# Patient Record
Sex: MALE | Race: BLACK OR AFRICAN AMERICAN | NOT HISPANIC OR LATINO | Employment: OTHER | URBAN - METROPOLITAN AREA
[De-identification: names, ages, dates, MRNs, and addresses within clinical notes are randomized per-mention and may not be internally consistent; named-entity substitution may affect disease eponyms.]

---

## 2018-01-27 ENCOUNTER — HOSPITAL ENCOUNTER (EMERGENCY)
Facility: HOSPITAL | Age: 33
Discharge: HOME | End: 2018-01-27
Attending: EMERGENCY MEDICINE
Payer: MEDICAID

## 2018-01-27 VITALS
RESPIRATION RATE: 20 BRPM | SYSTOLIC BLOOD PRESSURE: 146 MMHG | WEIGHT: 166.13 LBS | DIASTOLIC BLOOD PRESSURE: 79 MMHG | HEART RATE: 59 BPM | TEMPERATURE: 99 F | BODY MASS INDEX: 22.5 KG/M2 | OXYGEN SATURATION: 100 % | HEIGHT: 72 IN

## 2018-01-27 DIAGNOSIS — S61.411A: Primary | ICD-10-CM

## 2018-01-27 PROCEDURE — 90715 TDAP VACCINE 7 YRS/> IM: CPT | Performed by: EMERGENCY MEDICINE

## 2018-01-27 PROCEDURE — 90471 IMMUNIZATION ADMIN: CPT | Performed by: EMERGENCY MEDICINE

## 2018-01-27 PROCEDURE — 63600175 PHARM REV CODE 636 W HCPCS: Performed by: EMERGENCY MEDICINE

## 2018-01-27 PROCEDURE — 25000003 PHARM REV CODE 250: Performed by: EMERGENCY MEDICINE

## 2018-01-27 PROCEDURE — 99283 EMERGENCY DEPT VISIT LOW MDM: CPT

## 2018-01-27 RX ORDER — ACETAMINOPHEN 500 MG
1000 TABLET ORAL
Status: COMPLETED | OUTPATIENT
Start: 2018-01-27 | End: 2018-01-27

## 2018-01-27 RX ORDER — NAPROXEN 500 MG/1
500 TABLET ORAL 2 TIMES DAILY WITH MEALS
Qty: 20 TABLET | Refills: 0 | Status: SHIPPED | OUTPATIENT
Start: 2018-01-27 | End: 2019-08-09

## 2018-01-27 RX ADMIN — CLOSTRIDIUM TETANI TOXOID ANTIGEN (FORMALDEHYDE INACTIVATED), CORYNEBACTERIUM DIPHTHERIAE TOXOID ANTIGEN (FORMALDEHYDE INACTIVATED), BORDETELLA PERTUSSIS TOXOID ANTIGEN (GLUTARALDEHYDE INACTIVATED), BORDETELLA PERTUSSIS FILAMENTOUS HEMAGGLUTININ ANTIGEN (FORMALDEHYDE INACTIVATED), BORDETELLA PERTUSSIS PERTACTIN ANTIGEN, AND BORDETELLA PERTUSSIS FIMBRIAE 2/3 ANTIGEN 0.5 ML: 5; 2; 2.5; 5; 3; 5 INJECTION, SUSPENSION INTRAMUSCULAR at 04:01

## 2018-01-27 RX ADMIN — ACETAMINOPHEN 1000 MG: 500 TABLET ORAL at 04:01

## 2018-01-27 NOTE — ED PROVIDER NOTES
"SCRIBE #1 NOTE: I, Corinne Mack, am scribing for, and in the presence of, Daniel Wadsworth Jr., MD. I have scribed the entire note.      History      Chief Complaint   Patient presents with    Hand Pain     "i cut it two weeks ago and it still hurts       Review of patient's allergies indicates:   Allergen Reactions    Sulfa (sulfonamide antibiotics) Swelling        HPI   HPI    1/27/2018, 3:55 PM   History obtained from the patient      History of Present Illness: Tyler Kruse Jr. is a 32 y.o. male patient who presents to the Emergency Department for R hand pain which onset 2 weeks ago. Pt cut his hand on broken glass while replacing a window at his place of work. Pt did not seek any medical attention for his injury prior to today. Pt now c/o R hand swelling and pain. Symptoms are constant and moderate in severity. Pt's pain is exacerbated by ROM of his R hand. No mitigating factors reported. No other associated sxs reported. Patient denies any fever, chills, diaphoresis, SOB, cough, N/V/D, arthralgias, HA, dizziness, and all other sxs at this time. No prior Tx reported. No further complaints or concerns at this time. Unknown if pt is UTD on his Tetanus shot.        Arrival mode: Personal vehicle     PCP: Primary Doctor No       Past Medical History:  No past medical history on file.    Past Surgical History:  No past surgical history on file.      Family History:  No family history on file.    Social History:  Social History     Social History Main Topics    Smoking status: Former Smoker     Types: Cigarettes    Smokeless tobacco: Not on file    Alcohol use No    Drug use: No    Sexual activity: Not on file       ROS   Review of Systems   Constitutional: Negative for chills, diaphoresis and fever.   Respiratory: Negative for cough and shortness of breath.    Gastrointestinal: Negative for diarrhea, nausea and vomiting.   Genitourinary: Negative for dysuria.   Musculoskeletal: Positive for myalgias (R hand). " Negative for arthralgias, back pain and neck stiffness.        (+) R hand swelling   Skin: Positive for wound (R hand). Negative for rash.   Neurological: Negative for dizziness, weakness, light-headedness, numbness and headaches.   Hematological: Does not bruise/bleed easily.   All other systems reviewed and are negative.    Physical Exam      Initial Vitals [01/27/18 1540]   BP Pulse Resp Temp SpO2   (!) 146/79 (!) 59 20 98.6 °F (37 °C) 100 %      MAP       101.33          Physical Exam  Nursing Notes and Vital Signs Reviewed.  Constitutional: Patient is in no apparent distress. Well-developed and well-nourished.  Head: Atraumatic. Normocephalic.  Eyes: PERRL. EOM intact. Conjunctivae are not pale. No scleral icterus.  Neck: Supple. Full ROM.   Cardiovascular: Regular rate. Regular rhythm. No murmurs, rubs, or gallops. Distal pulses are 2+ and symmetric.  Pulmonary/Chest: No respiratory distress. Clear to auscultation bilaterally. No wheezing or rales.  Abdominal: Soft and non-distended.    Musculoskeletal: Moves all extremities. No obvious deformities.  Right Hand: No obvious deformity. Old scar noted over the 3rd MP joint that appears well-healing with no overt signs of infection, palpable fluctuance, erythema, or induration.There is mild swelling over the dorsal aspect of the 3rd MP joint.  There is mild tenderness to palpation over the R 3rd finger.  Full flexion and extension of the wrist.  Full flexion and extension of all fingers at the DIP, PIP and MCP joints.  Normal finger abduction, adduction, and thumb opposition.  Normal OK sign.  Able to make a fist without scissoring.  No laxity of the ulnar or radial collateral ligaments of the phalanges.  Radial, median, and ulnar nerves are intact. Radial and ulnar pulses are 2+. Normal capillary refill.  Distal sensation is intact.  Skin: Warm and dry.  Neurological:  Alert, awake, and appropriate.  Normal speech.  No acute focal neurological deficits are  appreciated.  Psychiatric: Normal affect. Good eye contact. Appropriate in content.    ED Course    Procedures  ED Vital Signs:  Vitals:    01/27/18 1540   BP: (!) 146/79   Pulse: (!) 59   Resp: 20   Temp: 98.6 °F (37 °C)   TempSrc: Oral   SpO2: 100%   Weight: 75.4 kg (166 lb 1.9 oz)   Height: 6' (1.829 m)       Abnormal Lab Results:  Labs Reviewed - No data to display     All Lab Results:  None    Imaging Results:  Imaging Results          X-Ray Hand 3 view Right (Final result)  Result time 01/27/18 16:24:03    Final result by Nico Loomis MD (01/27/18 16:24:03)                 Impression:           1.  Negative for acute process involving the visualized osseous structures.  2.  Moderate soft tissue swelling overlies the dorsum of the hand at the level of the knuckles, without air in the soft tissues or radiopaque foreign bodies.      Electronically signed by: NICO LOOMIS MD  Date:     01/27/18  Time:    16:24              Narrative:    Right hand x-ray, 3 views :    Clinical Indication: 3rd metacarpophalangeal joint pain . Pain in right hand    Findings: No comparison studies are available. 3 views of the right hand were submitted for interpretation.  Joint spaces are well-maintained.     Alignment is satisfactory. No   fractures, dislocations, or erosive arthritic change.  Negative for radiopaque foreign bodies or air in the soft tissues. Moderate soft tissue swelling involves the dorsum of the hand, overlying the knuckles.                                      The Emergency Provider reviewed the vital signs and test results, which are outlined above.    ED Discussion     5:06 PM: Reassessed pt at this time. Pt is awake, alert, and in no distress. Discussed with pt all pertinent ED information and results. Discussed pt dx and plan of tx. Gave pt all f/u and return to the ED instructions. All questions and concerns were addressed at this time. Pt expresses understanding of information and instructions, and is  comfortable with plan to discharge. Pt is stable for discharge.    I discussed with patient and/or family/caretaker that evaluation in the ED does not suggest any emergent or life threatening medical conditions requiring immediate intervention beyond what was provided in the ED, and I believe patient is safe for discharge.  Regardless, an unremarkable evaluation in the ED does not preclude the development or presence of a serious of life threatening condition. As such, patient was instructed to return immediately for any worsening or change in current symptoms.    I discussed wound care precautions with patient and/or family/caretaker; specifically that all wounds have risk of infection despite efforts to cleanse and debride the wound; and there is a risk of an occult foreign body (and thus increased risk of infection) despite a negative examination.  I discussed with patient need to return for any signs of infection, specifically redness, increased pain, fever, drainage of pus, or any concern, immediately.    Regarding LACERATION CARE, patient was instructed to wash hands with soap and warm water before and after caring for wound; keep the wound dry for the first 24 to 48 hours and then gently clean the wound once or twice a day with cool water using soap to clean around the wound; avoid using alcohol or hydrogen peroxide to clean wound unless directed to; and use bandages to keep wound clean and protected and to prevent swelling.  Advised patient to contact primary healthcare provider if wound splits open; becomes extremely painful; appears to not be healing; has a foreign object present; develop a purulent discharge; or note the skin around the wound becoming numb, edematous, or erythematous.  Patient instructed to follow up with primary care provider for wound re-check in 8-10 days.      ED Medication(s):  Medications   acetaminophen tablet 1,000 mg (1,000 mg Oral Given 1/27/18 5876)   Tdap vaccine injection 0.5  mL (0.5 mLs Intramuscular Given 1/27/18 1608)       Discharge Medication List as of 1/27/2018  5:44 PM      START taking these medications    Details   naproxen (NAPROSYN) 500 MG tablet Take 1 tablet (500 mg total) by mouth 2 (two) times daily with meals., Starting Sat 1/27/2018, Print             Follow-up Information     [facility] - Internal Medicine. Schedule an appointment as soon as possible for a visit in 1 week.    Specialty:  Internal Medicine  Contact information:  [address] [address] [phone]  Additional information:  (off [employer]) 1st floor           [facility]. Schedule an appointment as soon as possible for a visit in 1 week.    Contact information:  [address] [address] [phone]             [facility] - Orthopedics. Schedule an appointment as soon as possible for a visit in 1 week.    Specialty:  Orthopedics  Contact information:  [address] [address] [phone]  Additional information:  (off [employer]) 2nd floor           [facility] - .    Specialty:  Emergency Medicine  Why:  As needed, If symptoms worsen  Contact information:  [address] [facility] [address] [phone]                   Medical Decision Making    Medical Decision Making:   Clinical Tests:   Radiological Study: Ordered and Reviewed           Scribe Attestation:   Scribe #1: I performed the above scribed service and the documentation accurately describes the services I performed. I attest to the accuracy of the note.    Attending:   Physician Attestation Statement for Scribe #1: I, Daniel Wadsworth Jr., MD, personally performed the services described in this documentation, as scribed by Corinne Mack, in my presence, and it is both accurate and complete.          Clinical Impression       ICD-10-CM ICD-9-CM   1. Laceration of right hand with delay in treatment, initial encounter  S61.411A 882.1       Disposition:   Disposition: Discharged  Condition: Stable         Daniel Wadsworth Jr., MD  01/28/18 1028

## 2018-03-05 ENCOUNTER — HOSPITAL ENCOUNTER (EMERGENCY)
Facility: HOSPITAL | Age: 33
Discharge: HOME | End: 2018-03-05
Payer: MEDICAID

## 2018-03-05 VITALS
SYSTOLIC BLOOD PRESSURE: 154 MMHG | HEIGHT: 71 IN | BODY MASS INDEX: 22.82 KG/M2 | DIASTOLIC BLOOD PRESSURE: 77 MMHG | RESPIRATION RATE: 20 BRPM | HEART RATE: 66 BPM | TEMPERATURE: 98 F | OXYGEN SATURATION: 99 % | WEIGHT: 163 LBS
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
## 2018-03-05 DIAGNOSIS — S80.862A INSECT BITE OF LEFT LEG, INITIAL ENCOUNTER: Primary | ICD-10-CM

## 2018-03-05 PROCEDURE — 99283 EMERGENCY DEPT VISIT LOW MDM: CPT

## 2018-03-05 RX ORDER — CLINDAMYCIN HYDROCHLORIDE 150 MG/1
450 CAPSULE ORAL EVERY 8 HOURS
Qty: 60 CAPSULE | Refills: 0 | Status: SHIPPED | OUTPATIENT
Start: 2018-03-05 | End: 2018-03-12

## 2018-03-05 RX ORDER — MUPIROCIN 20 MG/G
OINTMENT TOPICAL 3 TIMES DAILY
Qty: 15 G | Refills: 0 | Status: SHIPPED | OUTPATIENT
Start: 2018-03-05 | End: 2018-03-15

## 2018-03-06 NOTE — ED PROVIDER NOTES
SCRIBE #1 NOTE: I, Ailyn Birch, am scribing for, and in the presence of, HILARIO Reyes. I have scribed the entire note.      History      Chief Complaint   Patient presents with    Insect Bite     insect bite to L knee with pain, redness and swelling       Review of patient's allergies indicates:   Allergen Reactions    Sulfa (sulfonamide antibiotics) Swelling        HPI   HPI    3/5/2018, 9:19 PM   History obtained from the patient      History of Present Illness: Tyler Kruse Jr. is a 32 y.o. male patient who presents to the Emergency Department for insect bite to L knee which onset gradually a few days ago. Patient states he has been cutting grass the last few days. Symptoms are constant and moderate in severity. No mitigating or exacerbating factors reported. Associated sxs include increased redness and swelling to lateral aspect of L knee. Patient denies any fever, chills, increased warmth to L knee, decreased ROM of L knee, L leg pain, extremity weakness/numbness, and all other sxs at this time. No further complaints or concerns at this time.     Arrival mode: Personal vehicle      PCP: Primary Doctor No       Past Medical History:  No past medical history on file.    Past Surgical History:  No past surgical history on file.      Family History:  No family history on file.    Social History:  Social History     Social History Main Topics    Smoking status: Former Smoker     Types: Cigarettes    Smokeless tobacco: Unknown    Alcohol use No    Drug use: No    Sexual activity: Unknown       ROS   Review of Systems   Constitutional: Negative for fever.   HENT: Negative for sore throat.    Respiratory: Negative for shortness of breath.    Cardiovascular: Negative for chest pain.   Gastrointestinal: Negative for nausea.   Genitourinary: Negative for dysuria.   Musculoskeletal: Negative for back pain.        (-) decreased ROM of L knee, L leg pain   Skin: Negative for rash.        (+) insect bite to L knee,  "increased redness and swelling to lateral aspect of L knee  (-) increased warmth to L knee   Neurological: Negative for weakness.   Hematological: Does not bruise/bleed easily.   All other systems reviewed and are negative.      Physical Exam      Initial Vitals [03/05/18 2055]   BP Pulse Resp Temp SpO2   (!) 154/77 66 20 98.2 °F (36.8 °C) 99 %      MAP       102.67          Physical Exam  Nursing Notes and Vital Signs Reviewed.  Constitutional: Patient is in no acute distress. Well-developed and well-nourished.  Head: Atraumatic. Normocephalic.  Eyes: PERRL. EOM intact. Conjunctivae are not pale. No scleral icterus.  ENT: Mucous membranes are moist. Oropharynx is clear and symmetric.    Neck: Supple. Full ROM. No lymphadenopathy.  Cardiovascular: Regular rate. Regular rhythm. No murmurs, rubs, or gallops. Distal pulses are 2+ and symmetric.  Pulmonary/Chest: No respiratory distress. Clear to auscultation bilaterally. No wheezing or rales.  Abdominal: Soft and non-distended.  There is no tenderness.  No rebound, guarding, or rigidity. Good bowel sounds.  Genitourinary: No CVA tenderness  Musculoskeletal: Moves all extremities. No obvious deformities. No edema. No calf tenderness.  Skin: Warm and dry. Area of erythema and tenderness to lateral aspect of L knee with apparent insect bite to central portion, no fluctuance.  Neurological:  Alert, awake, and appropriate.  Normal speech.  No acute focal neurological deficits are appreciated.  Psychiatric: Normal affect. Good eye contact. Appropriate in content.    ED Course    Procedures  ED Vital Signs:  Vitals:    03/05/18 2055   BP: (!) 154/77   Pulse: 66   Resp: 20   Temp: 98.2 °F (36.8 °C)   TempSrc: Oral   SpO2: 99%   Weight: 73.9 kg (163 lb 0.5 oz)   Height: 5' 11" (1.803 m)              The Emergency Provider reviewed the vital signs and test results, which are outlined above.    ED Discussion     9:22 PM: Reassessed pt at this time.  Discussed with pt all " pertinent ED information and results. Discussed pt dx and plan of tx. Gave pt all f/u and return to the ED instructions. All questions and concerns were addressed at this time. Pt expresses understanding of information and instructions, and is comfortable with plan to discharge. Pt is stable for discharge.    I discussed with patient and/or family/caretaker that evaluation in the ED does not suggest any emergent or life threatening medical conditions requiring immediate intervention beyond what was provided in the ED, and I believe patient is safe for discharge.  Regardless, an unremarkable evaluation in the ED does not preclude the development or presence of a serious of life threatening condition. As such, patient was instructed to return immediately for any worsening or change in current symptoms.      ED Medication(s):  Medications - No data to display    New Prescriptions    CLINDAMYCIN (CLEOCIN) 150 MG CAPSULE    Take 3 capsules (450 mg total) by mouth every 8 (eight) hours.    MUPIROCIN (BACTROBAN) 2 % OINTMENT    Apply topically 3 (three) times daily.             Medical Decision Making              Scribe Attestation:   Scribe #1: I performed the above scribed service and the documentation accurately describes the services I performed. I attest to the accuracy of the note.    Attending:   Physician Attestation Statement for Scribe #1: I, HILARIO Reyes, personally performed the services described in this documentation, as scribed by Ailyn Birch, in my presence, and it is both accurate and complete.          Clinical Impression       ICD-10-CM ICD-9-CM   1. Insect bite of left leg, initial encounter S80.862A 916.4    W57.XXXA E906.4       Disposition:   Disposition: Discharged  Condition: Stable         HILARIO Daly  03/05/18 8383

## 2019-08-09 ENCOUNTER — HOSPITAL ENCOUNTER (EMERGENCY)
Facility: HOSPITAL | Age: 34
Discharge: HOME | End: 2019-08-09
Attending: FAMILY MEDICINE
Payer: MEDICAID

## 2019-08-09 VITALS
TEMPERATURE: 98 F | SYSTOLIC BLOOD PRESSURE: 124 MMHG | WEIGHT: 170 LBS | BODY MASS INDEX: 23.03 KG/M2 | HEART RATE: 60 BPM | RESPIRATION RATE: 18 BRPM | OXYGEN SATURATION: 100 % | HEIGHT: 72 IN | DIASTOLIC BLOOD PRESSURE: 73 MMHG

## 2019-08-09 DIAGNOSIS — S90.30XA CONTUSION OF FOOT, UNSPECIFIED LATERALITY, INITIAL ENCOUNTER: Primary | ICD-10-CM

## 2019-08-09 DIAGNOSIS — M79.673 FOOT PAIN: ICD-10-CM

## 2019-08-09 PROCEDURE — 99283 EMERGENCY DEPT VISIT LOW MDM: CPT | Mod: 25

## 2019-08-09 PROCEDURE — 25000003 PHARM REV CODE 250: Performed by: NURSE PRACTITIONER

## 2019-08-09 RX ORDER — TRAMADOL HYDROCHLORIDE 50 MG/1
50 TABLET ORAL EVERY 6 HOURS PRN
Qty: 12 TABLET | Refills: 0 | Status: SHIPPED | OUTPATIENT
Start: 2019-08-09 | End: 2021-02-25 | Stop reason: ALTCHOICE

## 2019-08-09 RX ORDER — HYDROCODONE BITARTRATE AND ACETAMINOPHEN 5; 325 MG/1; MG/1
1 TABLET ORAL
Status: COMPLETED | OUTPATIENT
Start: 2019-08-09 | End: 2019-08-09

## 2019-08-09 RX ADMIN — HYDROCODONE BITARTRATE AND ACETAMINOPHEN 1 TABLET: 5; 325 TABLET ORAL at 11:08

## 2019-08-09 NOTE — ED PROVIDER NOTES
Encounter Date: 8/9/2019       History     Chief Complaint   Patient presents with    Foot Pain     pt reports he dropped a peice of wood onto the top of his L foot, area swollen and tender to touch. Good ROM     33 year old male with complaint of left foot pain X one hour.  Pt reports that he dropped a heavy piece of plywood on left foot.  Moderate pain. Worse with walking. No radiation of pain.  Mild relief with rest of foot.         Review of patient's allergies indicates:   Allergen Reactions    Sulfa (sulfonamide antibiotics) Swelling     History reviewed. No pertinent past medical history.  History reviewed. No pertinent surgical history.  History reviewed. No pertinent family history.  Social History     Tobacco Use    Smoking status: Former Smoker     Types: Cigarettes   Substance Use Topics    Alcohol use: No    Drug use: No     Types: Marijuana     Review of Systems   Constitutional: Negative for fever.   HENT: Negative for sore throat.    Respiratory: Negative for shortness of breath.    Cardiovascular: Negative for chest pain.   Gastrointestinal: Negative for nausea.   Genitourinary: Negative for dysuria.   Musculoskeletal: Negative for back pain.        Left foot pain    Skin: Negative for rash.   Neurological: Negative for weakness.   Hematological: Does not bruise/bleed easily.       Physical Exam     Initial Vitals [08/09/19 1106]   BP Pulse Resp Temp SpO2   124/73 60 18 97.6 °F (36.4 °C) 100 %      MAP       --         Physical Exam    Nursing note and vitals reviewed.  Constitutional: He appears well-developed and well-nourished.   HENT:   Head: Normocephalic and atraumatic.   Eyes: Conjunctivae are normal. Pupils are equal, round, and reactive to light.   Neck: Normal range of motion. Neck supple.   Cardiovascular: Normal rate, regular rhythm, normal heart sounds and intact distal pulses.   Pulmonary/Chest: Breath sounds normal.   Abdominal: Soft. There is no rebound and no guarding.    Musculoskeletal: Normal range of motion.   Tenderness and swelling distal dorsal aspect of left foot, 2+ left dorsalis pedis pulse   Neurological: He is alert.   Skin: Skin is warm and dry.   Psychiatric: He has a normal mood and affect. His behavior is normal. Thought content normal.         ED Course   Splint Application  Date/Time: 8/9/2019 11:48 AM  Performed by: Dg Hogan NP  Authorized by: Lilia Christine MD   Comments: ACE bandage applied to left foot: alignment good, neurovascular status intact        Labs Reviewed - No data to display       Imaging Results          X-Ray Foot Complete Left (Final result)  Result time 08/09/19 11:36:19    Final result by Rafat Vernon Jr., MD (08/09/19 11:36:19)                 Impression:      Swelling without evidence of acute fracture.      Electronically signed by: Rafat Vernon Jr., MD  Date:    08/09/2019  Time:    11:36             Narrative:    EXAMINATION:  XR FOOT COMPLETE 3 VIEW LEFT    CLINICAL HISTORY:  . Direct trauma.    COMPARISON:  None    FINDINGS:  Focal soft tissue swelling about the dorsum of the foot.  No acute fracture or dislocation.  No osseous lesion.  No significant degenerative changes.No radiopaque foreign bodies.                                                      Clinical Impression:       ICD-10-CM ICD-9-CM   1. Contusion of foot, unspecified laterality, initial encounter S90.30XA 924.20   2. Foot pain M79.673 729.5                                Dg Hogan NP  08/09/19 1150       Dg Hogan NP  08/09/19 1150

## 2021-06-01 PROBLEM — E55.9 VITAMIN D DEFICIENCY: Status: ACTIVE | Noted: 2019-04-04

## 2021-07-01 ENCOUNTER — PATIENT MESSAGE (OUTPATIENT)
Dept: ADMINISTRATIVE | Facility: OTHER | Age: 36
End: 2021-07-01

## 2021-12-21 ENCOUNTER — HOSPITAL ENCOUNTER (EMERGENCY)
Facility: HOSPITAL | Age: 36
Discharge: HOME | End: 2021-12-21
Attending: EMERGENCY MEDICINE
Payer: MEDICAID

## 2021-12-21 VITALS
HEART RATE: 62 BPM | DIASTOLIC BLOOD PRESSURE: 91 MMHG | HEIGHT: 71 IN | TEMPERATURE: 98 F | BODY MASS INDEX: 26.71 KG/M2 | RESPIRATION RATE: 18 BRPM | SYSTOLIC BLOOD PRESSURE: 131 MMHG | WEIGHT: 190.81 LBS | OXYGEN SATURATION: 97 %

## 2021-12-21 DIAGNOSIS — S89.91XA RIGHT KNEE INJURY, INITIAL ENCOUNTER: ICD-10-CM

## 2021-12-21 DIAGNOSIS — S83.91XA SPRAIN OF RIGHT KNEE, UNSPECIFIED LIGAMENT, INITIAL ENCOUNTER: Primary | ICD-10-CM

## 2021-12-21 PROCEDURE — 25000003 PHARM REV CODE 250: Performed by: NURSE PRACTITIONER

## 2021-12-21 PROCEDURE — 99283 EMERGENCY DEPT VISIT LOW MDM: CPT | Mod: 25

## 2021-12-21 RX ORDER — KETOROLAC TROMETHAMINE 10 MG/1
10 TABLET, FILM COATED ORAL
Status: COMPLETED | OUTPATIENT
Start: 2021-12-21 | End: 2021-12-21

## 2021-12-21 RX ORDER — TRAMADOL HYDROCHLORIDE 50 MG/1
50 TABLET ORAL
Status: COMPLETED | OUTPATIENT
Start: 2021-12-21 | End: 2021-12-21

## 2021-12-21 RX ORDER — DICLOFENAC SODIUM 50 MG/1
50 TABLET, DELAYED RELEASE ORAL 3 TIMES DAILY PRN
Qty: 15 TABLET | Refills: 0 | Status: SHIPPED | OUTPATIENT
Start: 2021-12-21

## 2021-12-21 RX ADMIN — TRAMADOL HYDROCHLORIDE 50 MG: 50 TABLET, COATED ORAL at 09:12

## 2021-12-21 RX ADMIN — KETOROLAC TROMETHAMINE 10 MG: 10 TABLET, FILM COATED ORAL at 09:12

## 2022-02-17 ENCOUNTER — HOSPITAL ENCOUNTER (EMERGENCY)
Facility: HOSPITAL | Age: 37
Discharge: HOME | End: 2022-02-17
Attending: EMERGENCY MEDICINE
Payer: MEDICAID

## 2022-02-17 VITALS
RESPIRATION RATE: 18 BRPM | WEIGHT: 190.13 LBS | DIASTOLIC BLOOD PRESSURE: 89 MMHG | BODY MASS INDEX: 26.52 KG/M2 | HEART RATE: 67 BPM | SYSTOLIC BLOOD PRESSURE: 146 MMHG | OXYGEN SATURATION: 95 % | TEMPERATURE: 99 F

## 2022-02-17 DIAGNOSIS — L02.511 ABSCESS OF RIGHT THUMB: Primary | ICD-10-CM

## 2022-02-17 PROCEDURE — 99284 EMERGENCY DEPT VISIT MOD MDM: CPT | Mod: 25

## 2022-02-17 PROCEDURE — 25000003 PHARM REV CODE 250: Performed by: EMERGENCY MEDICINE

## 2022-02-17 PROCEDURE — 26010 DRAINAGE OF FINGER ABSCESS: CPT | Mod: F5

## 2022-02-17 RX ORDER — CLINDAMYCIN HYDROCHLORIDE 150 MG/1
450 CAPSULE ORAL EVERY 8 HOURS
Qty: 45 CAPSULE | Refills: 0 | Status: SHIPPED | OUTPATIENT
Start: 2022-02-17 | End: 2022-02-22

## 2022-02-17 RX ORDER — LIDOCAINE HYDROCHLORIDE 10 MG/ML
1 INJECTION, SOLUTION EPIDURAL; INFILTRATION; INTRACAUDAL; PERINEURAL
Status: COMPLETED | OUTPATIENT
Start: 2022-02-17 | End: 2022-02-17

## 2022-02-17 RX ORDER — HYDROCODONE BITARTRATE AND ACETAMINOPHEN 5; 325 MG/1; MG/1
1 TABLET ORAL EVERY 4 HOURS PRN
Qty: 11 TABLET | Refills: 0 | Status: SHIPPED | OUTPATIENT
Start: 2022-02-17 | End: 2022-02-22

## 2022-02-17 RX ADMIN — LIDOCAINE HYDROCHLORIDE 10 MG: 10 INJECTION, SOLUTION EPIDURAL; INFILTRATION; INTRACAUDAL; PERINEURAL at 07:02

## 2022-02-17 NOTE — ED PROVIDER NOTES
SCRIBE #1 NOTE: I, Leon Concepcion, am scribing for, and in the presence of, Adam Sagastume MD. I have scribed the entire note.      History     Chief Complaint   Patient presents with    Hand Pain     Right thumb swelling and pain x 2 weeks secondary to thorn puncture while at work      Review of patient's allergies indicates:   Allergen Reactions    Sulfa (sulfonamide antibiotics) Swelling         History of Present Illness     HPI    2/17/2022, 7:26 AM  History obtained from the patient      History of Present Illness: Tyler Kruse Jr. is a 36 y.o. male patient who presents to the Emergency Department for evaluation of right thumb injury which occurred suddenly 2 weeks ago. Pt reports that he was stuck by a thorn in his right thumb 2 weeks ago. Since this initial injury he has developed worsening pain and swelling to the thumb. Symptoms are constant and moderate in severity. No mitigating or exacerbating factors reported. No associated symptoms reported. Patient denies any fever, chills, N/V, abdominal pain, dizziness, weakness, numbness, and all other sxs at this time. No prior Tx reported. No further complaints or concerns at this time.      Arrival mode: Personal vehicle    PCP: Primary Doctor No        Past Medical History:  History reviewed. No pertinent past medical history.    Past Surgical History:  Past Surgical History:   Procedure Laterality Date    ORTHOPEDIC SURGERY           Family History:  No family history on file.    Social History:  Social History     Tobacco Use    Smoking status: Former Smoker     Types: Cigarettes    Smokeless tobacco: Not on file   Substance and Sexual Activity    Alcohol use: No    Drug use: No     Types: Marijuana    Sexual activity: Not on file        Review of Systems     Review of Systems   Constitutional: Negative for chills and fever.   HENT: Negative for congestion and sore throat.    Respiratory: Negative for cough and shortness of breath.     Cardiovascular: Negative for chest pain.   Gastrointestinal: Negative for abdominal pain, diarrhea, nausea and vomiting.   Genitourinary: Negative for dysuria.   Musculoskeletal: Negative for back pain.   Skin: Positive for wound (right thumb puncture with pain and swelling). Negative for rash.   Neurological: Negative for dizziness, weakness and numbness.   Hematological: Does not bruise/bleed easily.   All other systems reviewed and are negative.     Physical Exam     Initial Vitals   BP Pulse Resp Temp SpO2   02/17/22 0723 02/17/22 0722 02/17/22 0722 02/17/22 0723 02/17/22 0722   (!) 146/89 67 18 98.7 °F (37.1 °C) 95 %      MAP       --                 Physical Exam  Nursing Notes and Vital Signs Reviewed.  Constitutional: Patient is in no acute distress. Well-developed and well-nourished.  Head: Atraumatic. Normocephalic.  Eyes: PERRL. EOM intact. Conjunctivae are not pale. No scleral icterus.  ENT: Mucous membranes are moist. Oropharynx is clear and symmetric.    Neck: Supple. Full ROM. No lymphadenopathy.  Cardiovascular: Regular rate. Regular rhythm. No murmurs, rubs, or gallops. Distal pulses are 2+ and symmetric.  Pulmonary/Chest: No respiratory distress. Clear to auscultation bilaterally. No wheezing or rales.  Abdominal: Soft and non-distended.  There is no tenderness.  No rebound, guarding, or rigidity.  Musculoskeletal: Moves all extremities. No obvious deformities. Swelling and erythema to lateral aspect of right thumb.  Skin: Warm and dry.  Neurological:  Alert, awake, and appropriate.  Normal speech.  No acute focal neurological deficits are appreciated.  Psychiatric: Normal affect. Good eye contact. Appropriate in content.     ED Course   I & D - Incision and Drainage    Date/Time: 2/17/2022 7:48 AM  Location procedure was performed: [facility] EMERGENCY DEPARTMENT  Performed by: Adam Sagastume MD  Authorized by: Adam Sagastume MD   Consent Done: Yes  Consent: Verbal consent obtained.  Risks and  "benefits: risks, benefits and alternatives were discussed  Consent given by: patient  Patient understanding: patient states understanding of the procedure being performed  Patient consent: the patient's understanding of the procedure matches consent given  Procedure consent: procedure consent matches procedure scheduled  Required items: required blood products, implants, devices, and special equipment available  Patient identity confirmed: , name and verbally with patient  Time out: Immediately prior to procedure a "time out" was called to verify the correct patient, procedure, equipment, support staff and site/side marked as required.  Type: abscess  Body area: upper extremity  Location details: right thumb  Anesthesia: local infiltration    Anesthesia:  Local Anesthetic: lidocaine 1% without epinephrine  Scalpel size: 11  Incision type: single straight  Complexity: simple  Drainage: pus  Drainage amount: scant  Wound treatment: incision and  drainage  Packing material:  in gauze  Complications: No  Specimens: No  Implants: No  Patient tolerance: Patient tolerated the procedure well with no immediate complications        ED Vital Signs:  Vitals:    22 0722 22 0723   BP:  (!) 146/89   Pulse: 67    Resp: 18    Temp:  98.7 °F (37.1 °C)   TempSrc:  Oral   SpO2: 95%    Weight: 86.3 kg (190 lb 2.4 oz)             The Emergency Provider reviewed the vital signs and test results, which are outlined above.     ED Discussion     7:48 AM: Reassessed pt at this time. Discussed with pt all pertinent ED information and results. Discussed pt dx and plan of tx. Gave pt all f/u and return to the ED instructions. All questions and concerns were addressed at this time. Pt expresses understanding of information and instructions, and is comfortable with plan to discharge. Pt is stable for discharge.    I discussed with patient and/or family/caretaker that evaluation in the ED does not suggest any emergent or life " threatening medical conditions requiring immediate intervention beyond what was provided in the ED, and I believe patient is safe for discharge.  Regardless, an unremarkable evaluation in the ED does not preclude the development or presence of a serious of life threatening condition. As such, patient was instructed to return immediately for any worsening or change in current symptoms.                   ED Medication(s):  Medications   LIDOcaine (PF) 10 mg/ml (1%) injection 10 mg (10 mg Other Given by Other 2/17/22 0730)       New Prescriptions    CLINDAMYCIN (CLEOCIN) 150 MG CAPSULE    Take 3 capsules (450 mg total) by mouth every 8 (eight) hours. for 5 days    HYDROCODONE-ACETAMINOPHEN (NORCO) 5-325 MG PER TABLET    Take 1 tablet by mouth every 4 (four) hours as needed.        Follow-up Information     [facility] In 2 days.    Contact information:  [id number] [address] [phone]                             Scribe Attestation:   Scribe #1: I performed the above scribed service and the documentation accurately describes the services I performed. I attest to the accuracy of the note.     Attending:   Physician Attestation Statement for Scribe #1: I, Adam Sagastume MD, personally performed the services described in this documentation, as scribed by Leon Concepcion, in my presence, and it is both accurate and complete.          Clinical Impression       ICD-10-CM ICD-9-CM   1. Abscess of right thumb  [phone]       Disposition:   Disposition: Discharged  Condition: Stable         Adam Sagastume MD  02/17/22 0809

## 2022-02-18 ENCOUNTER — PATIENT OUTREACH (OUTPATIENT)
Dept: EMERGENCY MEDICINE | Facility: HOSPITAL | Age: 37
End: 2022-02-18
Payer: MEDICAID

## 2022-03-10 ENCOUNTER — PATIENT OUTREACH (OUTPATIENT)
Dept: EMERGENCY MEDICINE | Facility: HOSPITAL | Age: 37
End: 2022-03-10
Payer: MEDICAID

## 2022-03-24 ENCOUNTER — PATIENT OUTREACH (OUTPATIENT)
Dept: EMERGENCY MEDICINE | Facility: HOSPITAL | Age: 37
End: 2022-03-24
Payer: MEDICAID

## 2022-10-19 ENCOUNTER — HOSPITAL ENCOUNTER (EMERGENCY)
Facility: HOSPITAL | Age: 37
Discharge: HOME | End: 2022-10-19
Attending: EMERGENCY MEDICINE
Payer: MEDICAID

## 2022-10-19 VITALS
TEMPERATURE: 98 F | WEIGHT: 174.19 LBS | HEART RATE: 63 BPM | DIASTOLIC BLOOD PRESSURE: 75 MMHG | HEIGHT: 71 IN | BODY MASS INDEX: 24.39 KG/M2 | RESPIRATION RATE: 16 BRPM | OXYGEN SATURATION: 100 % | SYSTOLIC BLOOD PRESSURE: 126 MMHG

## 2022-10-19 DIAGNOSIS — R22.42 LOCALIZED SWELLING OF LEFT LOWER LEG: Primary | ICD-10-CM

## 2022-10-19 PROCEDURE — 99283 EMERGENCY DEPT VISIT LOW MDM: CPT

## 2022-10-19 RX ORDER — IBUPROFEN 800 MG/1
800 TABLET ORAL EVERY 6 HOURS PRN
Qty: 20 TABLET | Refills: 0 | Status: SHIPPED | OUTPATIENT
Start: 2022-10-19

## 2022-10-19 NOTE — ED PROVIDER NOTES
"Encounter Date: 10/19/2022       History     Chief Complaint   Patient presents with    Leg Pain     Pt c/o L leg pain. Pt was playing football last night and was "kneed" in the L leg.        36-year-old male presents emergency department with complaints of left lower leg pain after getting knee playing football yesterday.  Patient denies any other injuries or symptoms at this time.    The history is provided by the patient.   Review of patient's allergies indicates:   Allergen Reactions    Sulfa (sulfonamide antibiotics) Swelling     No past medical history on file.  Past Surgical History:   Procedure Laterality Date    ORTHOPEDIC SURGERY       No family history on file.  Social History     Tobacco Use    Smoking status: Former     Types: Cigarettes   Substance Use Topics    Alcohol use: No    Drug use: No     Types: Marijuana     Review of Systems   Constitutional:  Negative for fever.   HENT:  Negative for sore throat.    Respiratory:  Negative for shortness of breath.    Cardiovascular:  Negative for chest pain.   Gastrointestinal:  Negative for nausea.   Genitourinary:  Negative for dysuria.   Musculoskeletal:  Positive for myalgias. Negative for back pain.        +left lower leg pain   Skin:  Negative for rash.   Neurological:  Negative for weakness.   Hematological:  Does not bruise/bleed easily.   All other systems reviewed and are negative.    Physical Exam     Initial Vitals [10/19/22 1135]   BP Pulse Resp Temp SpO2   126/75 63 16 98.4 °F (36.9 °C) 100 %      MAP       --         Physical Exam    Constitutional: He appears well-developed and well-nourished. No distress.   HENT:   Head: Normocephalic and atraumatic.   Nose: Nose normal.   Mouth/Throat: Uvula is midline and oropharynx is clear and moist.   Eyes: Conjunctivae and EOM are normal. Pupils are equal, round, and reactive to light.   Neck: Neck supple.   Normal range of motion.  Cardiovascular:  Normal rate and regular rhythm.         "   Pulmonary/Chest: Effort normal and breath sounds normal. No respiratory distress. He has no decreased breath sounds. He has no wheezes. He has no rales.   Abdominal: Abdomen is soft. Bowel sounds are normal. There is no abdominal tenderness.   Musculoskeletal:         General: Normal range of motion.      Cervical back: Normal range of motion and neck supple.      Left lower leg: Swelling and tenderness present.        Legs:      Neurological: He is alert and oriented to person, place, and time. He has normal strength. GCS eye subscore is 4. GCS verbal subscore is 5. GCS motor subscore is 6.   Skin: Skin is warm and dry. Capillary refill takes less than 2 seconds. No rash noted.   Psychiatric: He has a normal mood and affect. His speech is normal and behavior is normal.       ED Course   Procedures  Labs Reviewed - No data to display       Imaging Results    None          Medications - No data to display                           Clinical Impression:   Final diagnoses:  [R22.42] Localized swelling of left lower leg               Go Harris Jr., [facility]  10/19/22 8648